# Patient Record
Sex: FEMALE | Race: WHITE | NOT HISPANIC OR LATINO | Employment: OTHER | URBAN - METROPOLITAN AREA
[De-identification: names, ages, dates, MRNs, and addresses within clinical notes are randomized per-mention and may not be internally consistent; named-entity substitution may affect disease eponyms.]

---

## 2018-01-09 ENCOUNTER — APPOINTMENT (OUTPATIENT)
Dept: WOMENS IMAGING | Facility: HOSPITAL | Age: 53
End: 2018-01-09

## 2018-01-09 PROCEDURE — 77067 SCR MAMMO BI INCL CAD: CPT | Performed by: RADIOLOGY

## 2018-01-09 PROCEDURE — 77063 BREAST TOMOSYNTHESIS BI: CPT | Performed by: RADIOLOGY

## 2018-05-29 ENCOUNTER — OFFICE VISIT (OUTPATIENT)
Dept: CARDIOLOGY | Facility: CLINIC | Age: 53
End: 2018-05-29

## 2018-05-29 VITALS
OXYGEN SATURATION: 92 % | DIASTOLIC BLOOD PRESSURE: 71 MMHG | WEIGHT: 101.2 LBS | HEART RATE: 72 BPM | HEIGHT: 59 IN | SYSTOLIC BLOOD PRESSURE: 101 MMHG | BODY MASS INDEX: 20.4 KG/M2

## 2018-05-29 DIAGNOSIS — R06.02 SHORTNESS OF BREATH: Primary | ICD-10-CM

## 2018-05-29 DIAGNOSIS — E78.2 MIXED HYPERLIPIDEMIA: ICD-10-CM

## 2018-05-29 DIAGNOSIS — I10 ESSENTIAL HYPERTENSION: ICD-10-CM

## 2018-05-29 PROCEDURE — 99204 OFFICE O/P NEW MOD 45 MIN: CPT | Performed by: PHYSICIAN ASSISTANT

## 2018-05-29 RX ORDER — PRAVASTATIN SODIUM 10 MG
10 TABLET ORAL DAILY
COMMUNITY

## 2018-05-29 RX ORDER — LOSARTAN POTASSIUM 50 MG/1
50 TABLET ORAL DAILY
Qty: 30 TABLET | Refills: 5 | Status: SHIPPED | OUTPATIENT
Start: 2018-05-29 | End: 2018-11-23 | Stop reason: SDUPTHER

## 2018-05-29 RX ORDER — LOSARTAN POTASSIUM AND HYDROCHLOROTHIAZIDE 12.5; 5 MG/1; MG/1
1 TABLET ORAL DAILY
COMMUNITY
End: 2018-05-29

## 2018-05-29 RX ORDER — ALENDRONATE SODIUM 70 MG/1
70 TABLET ORAL
COMMUNITY

## 2018-05-29 RX ORDER — LORATADINE 10 MG/1
10 CAPSULE, LIQUID FILLED ORAL DAILY
COMMUNITY

## 2018-05-29 RX ORDER — MONTELUKAST SODIUM 10 MG/1
10 TABLET ORAL NIGHTLY
COMMUNITY

## 2018-05-29 NOTE — PROGRESS NOTES
Subjective   Idania Ramesh is a 53 y.o. female     Chief Complaint   Patient presents with   • Establish Care     PATIENT APPEARS IN OFFICE TODAY TO [facility] CARDIAC CARE    • Hypertension       HPI  The patient presents today for initial evaluation.  She presents in setting of hypertension and shortness of air.  The patient denies cardiovascular history.  She reports a previous cardiac evaluation approximately 5 years ago which was very much unremarkable.  She was seen at that time because of bilateral arm discomfort.  She really has done well since with regards to symptoms.  She has chronic dyspnea which she feels is more related to her reactive airways disease.  She denies chest pain.  She has no further bilateral arm discomfort.  She denies PND orthopnea.  Hypertension has been an issue for her recently.  She tells me that she first started noticing hypertension of any significance approximately 2 years ago.  This occurred mostly with stress.  She was initially treated with diltiazem but had numerous issues with that.  Then eventually was discontinued.  She has since been treated with losartan HCTZ.  That has been a recent start.  She now has hypotension.  She brings a blood pressure log with her today were blood pressure values are 100/60 and typically lower.  Would hypotensive, she gets very weak and dizzy.  She has been referred for further evaluation and for review otherwise.  She has no further complaints.          Current Outpatient Prescriptions   Medication Sig Dispense Refill   • alendronate (FOSAMAX) 70 MG tablet Take 70 mg by mouth Every 7 (Seven) Days.     • estrogen, conjugated,-medroxyprogesterone (PREMPRO) 0.625-5 MG per tablet Take 1 tablet by mouth Daily.     • Loratadine (CLARITIN) 10 MG capsule Take 10 mg by mouth Daily.     • mometasone-formoterol (DULERA 200) 200-5 MCG/ACT inhaler Inhale 2 puffs 2 (Two) Times a Day.     • montelukast (SINGULAIR) 10 MG tablet Take 10 mg by mouth Every Night.      • pravastatin (PRAVACHOL) 10 MG tablet Take 10 mg by mouth Daily.     • losartan (COZAAR) 50 MG tablet Take 1 tablet by mouth Daily. 30 tablet 5     No current facility-administered medications for this visit.        Sulfa antibiotics    Past Medical History:   Diagnosis Date   • Asthma    • Hyperlipidemia    • Hypertension        Social History     Social History   • Marital status: Unknown     Spouse name: N/A   • Number of children: N/A   • Years of education: N/A     Occupational History   • Not on file.     Social History Main Topics   • Smoking status: Never Smoker   • Smokeless tobacco: Never Used   • Alcohol use No   • Drug use: No   • Sexual activity: Defer     Other Topics Concern   • Not on file     Social History Narrative   • No narrative on file       Family History   Problem Relation Age of Onset   • Heart disease Mother    • Hypertension Mother    • Hyperlipidemia Mother    • Heart disease Father    • Hyperlipidemia Father    • Hypertension Father    • Hypertension Brother    • Hyperlipidemia Brother        Review of Systems   Constitutional: Negative.  Negative for fatigue.   HENT: Negative.  Negative for congestion, rhinorrhea, sneezing and sore throat.    Eyes: Negative.  Negative for visual disturbance.   Respiratory: Positive for shortness of breath (SOA WITH EXERTION ONLY). Negative for apnea, cough, chest tightness and wheezing.    Cardiovascular: Negative.  Negative for chest pain (DENIES CP), palpitations (DENIES PALPITATIONS) and leg swelling.   Gastrointestinal: Negative.  Negative for abdominal distention, abdominal pain, nausea and vomiting.   Endocrine: Negative.  Negative for cold intolerance, heat intolerance, polyphagia and polyuria.   Genitourinary: Negative.  Negative for difficulty urinating, frequency and urgency.   Musculoskeletal: Positive for arthralgias (JOINTS). Negative for back pain, myalgias, neck pain and neck stiffness.   Skin: Negative.  Negative for rash and wound.  "  Allergic/Immunologic: Negative.  Negative for environmental allergies and food allergies.   Neurological: Negative.  Negative for dizziness, weakness, light-headedness and headaches.   Hematological: Negative.  Does not bruise/bleed easily.   Psychiatric/Behavioral: Positive for agitation (EASILY AGITATED). Negative for confusion and sleep disturbance (DENIES WAKING UP SMOTHERING/SOA). The patient is nervous/anxious (EASILY NERVOUS/ANXIOUS).        Objective     Vitals:    05/29/18 0929   BP: 101/71   BP Location: Left arm   Patient Position: Sitting   Pulse: 72   SpO2: 92%   Weight: 45.9 kg (101 lb 3.2 oz)   Height: 149.9 cm (59\")        /71 (BP Location: Left arm, Patient Position: Sitting)   Pulse 72   Ht 149.9 cm (59\")   Wt 45.9 kg (101 lb 3.2 oz)   SpO2 92%   BMI 20.44 kg/m²      Lab Results (most recent)     None          Physical Exam   Constitutional: She is oriented to person, place, and time. She appears well-developed and well-nourished. No distress.   HENT:   Head: Normocephalic and atraumatic.   Eyes: Conjunctivae and EOM are normal. Pupils are equal, round, and reactive to light.   Neck: Normal range of motion. Neck supple. No JVD present. No tracheal deviation present.   Cardiovascular: Normal rate, regular rhythm, normal heart sounds and intact distal pulses.    Pulmonary/Chest: Effort normal and breath sounds normal.   Abdominal: Soft. Bowel sounds are normal. She exhibits no distension and no mass. There is no tenderness. There is no rebound and no guarding.   Musculoskeletal: Normal range of motion. She exhibits no edema, tenderness or deformity.   Neurological: She is alert and oriented to person, place, and time.   Skin: Skin is warm and dry. No rash noted. No erythema. No pallor.   Psychiatric: She has a normal mood and affect. Her behavior is normal. Judgment and thought content normal.   Nursing note and vitals reviewed.      Procedure   Procedures         Assessment/Plan      " Diagnosis Plan   1. Shortness of breath  Adult Transthoracic Echo Complete W/ Cont if Necessary Per Protocol    Adult Transthoracic Echo Complete W/ Cont if Necessary Per Protocol   2. Essential hypertension  Adult Transthoracic Echo Complete W/ Cont if Necessary Per Protocol    Adult Transthoracic Echo Complete W/ Cont if Necessary Per Protocol   3. Mixed hyperlipidemia  Adult Transthoracic Echo Complete W/ Cont if Necessary Per Protocol    Adult Transthoracic Echo Complete W/ Cont if Necessary Per Protocol        The patient is now hypotensive on current losartan HCTZ therapy.  This is very symptomatic, all as above.  I will discontinue that formulation and restart losartan 50 mg as a single agent.  I would like to schedule for an echo just to evaluate systolic diastolic parameters, LV size, and I feel morphologies.  We discussed dietary modification, lifestyle modification, etc.  She will continue to follow blood pressures closely and call us for any issues.  I would like to see her back after echo and we can see how she is done with medication change and review echo findings at that time.  She does tell me that she had recent labs.  She tells me that lipid parameters at that time were very well treated.  We have requested a copy of those laboratories.  Further pending response to medication and echo findings.  She will call for any issues otherwise.           Patient's Body mass index is 20.44 kg/m². BMI is within normal parameters. No follow-up required.           Electronically signed by:

## 2018-06-04 ENCOUNTER — HOSPITAL ENCOUNTER (OUTPATIENT)
Dept: CARDIOLOGY | Facility: HOSPITAL | Age: 53
Discharge: HOME | End: 2018-06-04
Admitting: PHYSICIAN ASSISTANT

## 2018-06-04 ENCOUNTER — OUTSIDE FACILITY SERVICE (OUTPATIENT)
Dept: CARDIOLOGY | Facility: CLINIC | Age: 53
End: 2018-06-04

## 2018-06-04 LAB
MAXIMAL PREDICTED HEART RATE: 167 BPM
STRESS TARGET HR: 142 BPM

## 2018-06-04 PROCEDURE — 93306 TTE W/DOPPLER COMPLETE: CPT

## 2018-06-04 PROCEDURE — 93306 TTE W/DOPPLER COMPLETE: CPT | Performed by: INTERNAL MEDICINE

## 2018-06-11 ENCOUNTER — DOCUMENTATION (OUTPATIENT)
Dept: CARDIOLOGY | Facility: CLINIC | Age: 53
End: 2018-06-11

## 2018-06-12 ENCOUNTER — TELEPHONE (OUTPATIENT)
Dept: CARDIOLOGY | Facility: CLINIC | Age: 53
End: 2018-06-12

## 2018-06-12 NOTE — TELEPHONE ENCOUNTER
----- Message from Stacie Garcia MA sent at 6/12/2018  9:21 AM EDT -----      ----- Message -----  From: Sofi Mejia  Sent: 6/12/2018   9:07 AM  To: Sher Hartley [place]     Idania is returning a missed call for her echo results. [phone]

## 2018-06-27 ENCOUNTER — TELEPHONE (OUTPATIENT)
Dept: CARDIOLOGY | Facility: CLINIC | Age: 53
End: 2018-06-27

## 2018-07-30 ENCOUNTER — OFFICE VISIT (OUTPATIENT)
Dept: CARDIOLOGY | Facility: CLINIC | Age: 53
End: 2018-07-30

## 2018-07-30 VITALS
SYSTOLIC BLOOD PRESSURE: 173 MMHG | BODY MASS INDEX: 20.76 KG/M2 | DIASTOLIC BLOOD PRESSURE: 93 MMHG | HEIGHT: 59 IN | OXYGEN SATURATION: 97 % | HEART RATE: 77 BPM | WEIGHT: 103 LBS

## 2018-07-30 DIAGNOSIS — E78.5 HYPERLIPIDEMIA, UNSPECIFIED HYPERLIPIDEMIA TYPE: ICD-10-CM

## 2018-07-30 DIAGNOSIS — R06.02 SHORTNESS OF BREATH: ICD-10-CM

## 2018-07-30 DIAGNOSIS — I10 ESSENTIAL HYPERTENSION: Primary | ICD-10-CM

## 2018-07-30 PROCEDURE — 99214 OFFICE O/P EST MOD 30 MIN: CPT | Performed by: NURSE PRACTITIONER

## 2018-07-30 RX ORDER — AMLODIPINE BESYLATE 2.5 MG/1
2.5 TABLET ORAL DAILY
Qty: 30 TABLET | Refills: 11 | Status: SHIPPED | OUTPATIENT
Start: 2018-07-30 | End: 2019-05-21 | Stop reason: SDUPTHER

## 2018-07-30 NOTE — PROGRESS NOTES
Subjective   Idania Ramesh is a 53 y.o. female     Chief Complaint   Patient presents with   • Follow-up     presents for echo f/u   • Hypertension       HPI    Problem List:    1. Hypertension  1.1 Echo 6/4/18 - mild LVH; EF > 65%; DD II; mild AR; mild MR: mild TR; mild SD  2. Hyperlipidemia   3. Asthma     Patient is a 53-year-old female who presents today for follow-up on testing with her  at her side.  She denies any chest pain, pressure, palpitations, fluttering, dizziness, presyncope, syncope, orthopnea, PND or edema.  She says she gets short of breath when she walks more due to her asthma.  Patient says her blood pressure at home has still been running elevated.    We went over echo.    Current Outpatient Prescriptions   Medication Sig Dispense Refill   • alendronate (FOSAMAX) 70 MG tablet Take 70 mg by mouth Every 7 (Seven) Days.     • EPINEPHrine (EPIPEN 2-SIMON IJ) Inject  as directed As Needed.     • estrogen, conjugated,-medroxyprogesterone (PREMPRO) 0.625-5 MG per tablet Take 1 tablet by mouth Daily.     • Loratadine (CLARITIN) 10 MG capsule Take 10 mg by mouth Daily.     • losartan (COZAAR) 50 MG tablet Take 1 tablet by mouth Daily. 30 tablet 5   • mometasone-formoterol (DULERA 200) 200-5 MCG/ACT inhaler Inhale 2 puffs 2 (Two) Times a Day.     • montelukast (SINGULAIR) 10 MG tablet Take 10 mg by mouth Every Night.     • pravastatin (PRAVACHOL) 10 MG tablet Take 10 mg by mouth Daily.     • amLODIPine (NORVASC) 2.5 MG tablet Take 1 tablet by mouth Daily. 30 tablet 11     No current facility-administered medications for this visit.        ALLERGIES    Sulfa antibiotics    Past Medical History:   Diagnosis Date   • Asthma    • Hyperlipidemia    • Hypertension        Social History     Social History   • Marital status: Unknown     Spouse name: N/A   • Number of children: N/A   • Years of education: N/A     Occupational History   • Not on file.     Social History Main Topics   • Smoking status:  "Never Smoker   • Smokeless tobacco: Never Used   • Alcohol use No   • Drug use: No   • Sexual activity: Defer     Other Topics Concern   • Not on file     Social History Narrative   • No narrative on file       Family History   Problem Relation Age of Onset   • Heart disease Mother    • Hypertension Mother    • Hyperlipidemia Mother    • Heart disease Father    • Hyperlipidemia Father    • Hypertension Father    • Hypertension Brother    • Hyperlipidemia Brother        Review of Systems   Constitutional: Negative for chills, diaphoresis, fatigue and fever.   HENT: Positive for rhinorrhea. Negative for nosebleeds, sinus pain, sinus pressure and tinnitus.    Eyes: Negative for visual disturbance.   Respiratory: Positive for shortness of breath (on exertion - more walking due to asthma). Negative for apnea, chest tightness and wheezing.    Cardiovascular: Negative for chest pain (denies CP), palpitations (denies palps) and leg swelling.   Gastrointestinal: Negative for abdominal pain, blood in stool, constipation, diarrhea, nausea and vomiting.   Endocrine: Negative.    Genitourinary: Negative for hematuria.   Musculoskeletal: Positive for arthralgias. Negative for back pain, myalgias and neck pain.   Skin: Negative for rash and wound.   Allergic/Immunologic: Positive for environmental allergies. Negative for food allergies.   Neurological: Negative for dizziness, syncope, weakness, light-headedness, numbness and headaches.   Hematological: Does not bruise/bleed easily.   Psychiatric/Behavioral: Negative for agitation, confusion and sleep disturbance. The patient is nervous/anxious (on occasion).        Objective   /93 (BP Location: Left arm, Patient Position: Sitting)   Pulse 77   Ht 149.9 cm (59\")   Wt 46.7 kg (103 lb)   SpO2 97%   BMI 20.80 kg/m²   Vitals:    07/30/18 0943   BP: 173/93   BP Location: Left arm   Patient Position: Sitting   Pulse: 77   SpO2: 97%   Weight: 46.7 kg (103 lb)   Height: 149.9 cm " "(59\")      Lab Results (most recent)     None        Physical Exam   Constitutional: She is oriented to person, place, and time. Vital signs are normal. She appears well-developed and well-nourished. She is active and cooperative.   HENT:   Head: Normocephalic.   Eyes: Lids are normal.   Neck: Normal carotid pulses, no hepatojugular reflux and no JVD present. Carotid bruit is not present.   Cardiovascular: Normal rate, regular rhythm and normal heart sounds.    Pulses:       Radial pulses are 2+ on the right side, and 2+ on the left side.        Dorsalis pedis pulses are 2+ on the right side, and 2+ on the left side.        Posterior tibial pulses are 2+ on the right side, and 2+ on the left side.   No edema BLE.    Pulmonary/Chest: Effort normal and breath sounds normal.   Abdominal: Normal appearance and bowel sounds are normal.   Neurological: She is alert and oriented to person, place, and time.   Skin: Skin is warm, dry and intact.   Psychiatric: She has a normal mood and affect. Her speech is normal and behavior is normal. Judgment and thought content normal. Cognition and memory are normal.       Procedure   Procedures         Assessment/Plan      Diagnosis Plan   1. Essential hypertension  amLODIPine (NORVASC) 2.5 MG tablet   2. Hyperlipidemia, unspecified hyperlipidemia type     3. Shortness of breath         Return in about 8 weeks (around 9/24/2018).    Hypertension-patient will start amlodipine 2.5 mg.  She will take it opposite times a day as her losartan.  Hyperlipidemia-patient is on pravastatin and monitor by PCP.  Shortness of breath-stable.  Patient will monitor blood pressure and heart rate and bring to follow-up appointment.  She'll follow-up in 8 weeks or sooner if she has any changes.         Patient's Body mass index is 20.8 kg/m². BMI is within normal parameters. No follow-up required.      Electronically signed by:        "

## 2018-07-30 NOTE — PATIENT INSTRUCTIONS

## 2018-07-31 PROBLEM — R06.02 SHORTNESS OF BREATH: Status: ACTIVE | Noted: 2018-07-31

## 2018-11-23 RX ORDER — LOSARTAN POTASSIUM 50 MG/1
TABLET ORAL
Qty: 30 TABLET | Refills: 5 | Status: SHIPPED | OUTPATIENT
Start: 2018-11-23 | End: 2019-05-21 | Stop reason: SDUPTHER

## 2019-01-10 ENCOUNTER — APPOINTMENT (OUTPATIENT)
Dept: WOMENS IMAGING | Facility: HOSPITAL | Age: 54
End: 2019-01-10

## 2019-01-10 PROCEDURE — 77063 BREAST TOMOSYNTHESIS BI: CPT | Performed by: RADIOLOGY

## 2019-01-10 PROCEDURE — 77067 SCR MAMMO BI INCL CAD: CPT | Performed by: RADIOLOGY

## 2019-05-21 ENCOUNTER — OFFICE VISIT (OUTPATIENT)
Dept: CARDIOLOGY | Facility: CLINIC | Age: 54
End: 2019-05-21

## 2019-05-21 VITALS
HEART RATE: 89 BPM | WEIGHT: 111.6 LBS | OXYGEN SATURATION: 96 % | SYSTOLIC BLOOD PRESSURE: 150 MMHG | HEIGHT: 59 IN | DIASTOLIC BLOOD PRESSURE: 93 MMHG | BODY MASS INDEX: 22.5 KG/M2

## 2019-05-21 DIAGNOSIS — E78.5 HYPERLIPIDEMIA, UNSPECIFIED HYPERLIPIDEMIA TYPE: ICD-10-CM

## 2019-05-21 DIAGNOSIS — I10 ESSENTIAL HYPERTENSION: Primary | ICD-10-CM

## 2019-05-21 DIAGNOSIS — R06.02 SHORTNESS OF BREATH: ICD-10-CM

## 2019-05-21 PROCEDURE — 93000 ELECTROCARDIOGRAM COMPLETE: CPT | Performed by: NURSE PRACTITIONER

## 2019-05-21 PROCEDURE — 99214 OFFICE O/P EST MOD 30 MIN: CPT | Performed by: NURSE PRACTITIONER

## 2019-05-21 RX ORDER — LOSARTAN POTASSIUM 50 MG/1
TABLET ORAL
Qty: 30 TABLET | Refills: 5 | Status: SHIPPED | OUTPATIENT
Start: 2019-05-21 | End: 2019-11-05 | Stop reason: SDUPTHER

## 2019-05-21 RX ORDER — AMLODIPINE BESYLATE 5 MG/1
5 TABLET ORAL DAILY
Qty: 30 TABLET | Refills: 11 | Status: SHIPPED | OUTPATIENT
Start: 2019-05-21 | End: 2019-11-05 | Stop reason: SDUPTHER

## 2019-05-21 NOTE — PROGRESS NOTES
Subjective   Idania Ramesh is a 54 y.o. female     Chief Complaint   Patient presents with   • Follow-up   • Hypertension       HPI    Problem List:    1. Hypertension  1.1 Echo 6/4/18 - mild LVH; EF > 65%; DD II; mild AR; mild MR: mild TR; mild MA  2. Hyperlipidemia   3. Asthma     Patient is a 54-year-old female who presents today for follow-up on medication change.  She denies any chest pain, pressure, palpitations, fluttering, dizziness, presyncope, syncope, orthopnea, PND or edema.  She denies any shortness of breath with activity.  She is very close to her 's family and this is been helping her a lot.  Her blood pressure however is still elevated so we are going to increase her amlodipine.    Current Outpatient Medications   Medication Sig Dispense Refill   • alendronate (FOSAMAX) 70 MG tablet Take 70 mg by mouth Every 7 (Seven) Days.     • amLODIPine (NORVASC) 2.5 MG tablet Take 1 tablet by mouth Daily. 30 tablet 11   • EPINEPHrine (EPIPEN 2-SIMON IJ) Inject  as directed As Needed.     • Loratadine (CLARITIN) 10 MG capsule Take 10 mg by mouth Daily.     • losartan (COZAAR) 50 MG tablet TAKE 1 TABLET DAILY 30 tablet 5   • mometasone-formoterol (DULERA 200) 200-5 MCG/ACT inhaler Inhale 2 puffs 2 (Two) Times a Day.     • montelukast (SINGULAIR) 10 MG tablet Take 10 mg by mouth Every Night.     • pravastatin (PRAVACHOL) 10 MG tablet Take 10 mg by mouth Daily.     • estrogen, conjugated,-medroxyprogesterone (PREMPRO) 0.625-5 MG per tablet Take 1 tablet by mouth Daily.       No current facility-administered medications for this visit.        ALLERGIES    Sulfa antibiotics    Past Medical History:   Diagnosis Date   • Asthma    • Hyperlipidemia    • Hypertension        Social History     Socioeconomic History   • Marital status:      Spouse name: Not on file   • Number of children: Not on file   • Years of education: Not on file   • Highest education level: Not on file   Tobacco Use   • Smoking  "status: Never Smoker   • Smokeless tobacco: Never Used   Substance and Sexual Activity   • Alcohol use: No   • Drug use: No   • Sexual activity: Defer       Family History   Problem Relation Age of Onset   • Heart disease Mother    • Hypertension Mother    • Hyperlipidemia Mother    • Heart disease Father    • Hyperlipidemia Father    • Hypertension Father    • Hypertension Brother    • Hyperlipidemia Brother        Review of Systems   Constitutional: Negative for diaphoresis and fatigue.   HENT: Negative for congestion, rhinorrhea and sore throat.    Eyes: Negative for visual disturbance.   Respiratory: Negative for chest tightness and shortness of breath.    Cardiovascular: Negative for chest pain (Denies CP ), palpitations and leg swelling.   Gastrointestinal: Negative for abdominal pain, blood in stool, constipation, diarrhea, nausea and vomiting.   Endocrine: Negative for cold intolerance and heat intolerance.   Genitourinary: Negative for difficulty urinating, dysuria, frequency, hematuria and urgency.   Musculoskeletal: Positive for arthralgias. Negative for back pain and neck pain.   Skin: Negative for rash and wound.   Allergic/Immunologic: Positive for environmental allergies (seasonal ). Negative for food allergies.   Neurological: Negative for dizziness, syncope, weakness, light-headedness, numbness and headaches.   Hematological: Does not bruise/bleed easily.   Psychiatric/Behavioral: Negative for sleep disturbance. The patient is not nervous/anxious.         Pt states she currently sees grief counselor due to the loss of her         Objective   /93   Pulse 89   Ht 149.9 cm (59\")   Wt 50.6 kg (111 lb 9.6 oz)   SpO2 96%   BMI 22.54 kg/m²   Vitals:    05/21/19 1419   BP: 150/93   Pulse: 89   SpO2: 96%   Weight: 50.6 kg (111 lb 9.6 oz)   Height: 149.9 cm (59\")      Lab Results (most recent)     None        Physical Exam   Constitutional: She is oriented to person, place, and time. Vital " signs are normal. She appears well-developed and well-nourished. She is active and cooperative.   HENT:   Head: Normocephalic.   Eyes: Lids are normal.   Neck: Normal carotid pulses, no hepatojugular reflux and no JVD present. Carotid bruit is not present.   Cardiovascular: Normal rate, regular rhythm and normal heart sounds.   Pulses:       Radial pulses are 2+ on the right side, and 2+ on the left side.        Dorsalis pedis pulses are 2+ on the right side, and 2+ on the left side.        Posterior tibial pulses are 2+ on the right side, and 2+ on the left side.   No edema BLE.    Pulmonary/Chest: Effort normal and breath sounds normal.   Abdominal: Normal appearance and bowel sounds are normal.   Neurological: She is alert and oriented to person, place, and time.   Skin: Skin is warm, dry and intact.   Psychiatric: She has a normal mood and affect. Her speech is normal and behavior is normal. Judgment and thought content normal. Cognition and memory are normal.       Procedure     ECG 12 Lead  Date/Time: 5/21/2019 2:48 PM  Performed by: Dianelys Cagle APRN  Authorized by: Dianelys Cagle APRN   Comparison: compared with previous ECG from 5/16/2018  Similar to previous ECG  Rhythm: sinus rhythm  Rate: normal  BPM: 91  QRS axis: normal    Clinical impression: normal ECG                 Assessment/Plan      Diagnosis Plan   1. Essential hypertension     2. Hyperlipidemia, unspecified hyperlipidemia type     3. Shortness of breath         Return in about 3 months (around 8/21/2019).       Hypertension-increase amlodipine to 5 mg and she will continue her losartan.  Hyperlipidemia-patient is on pravastatin monitor by PCP.  Shortness of breath-resolved.  She will continue her medication regimen.  She will follow-up in 3 months or sooner if any changes.  If her blood pressure is not staying controlled she will contact our office and we will make further changes.      Patient's Body mass index is 22.54 kg/m². BMI is  within normal parameters. No follow-up required..      Electronically signed by:

## 2019-05-21 NOTE — PATIENT INSTRUCTIONS

## 2019-08-30 ENCOUNTER — OFFICE VISIT (OUTPATIENT)
Dept: CARDIOLOGY | Facility: CLINIC | Age: 54
End: 2019-08-30

## 2019-08-30 VITALS
BODY MASS INDEX: 23.06 KG/M2 | HEART RATE: 99 BPM | OXYGEN SATURATION: 90 % | HEIGHT: 59 IN | WEIGHT: 114.4 LBS | DIASTOLIC BLOOD PRESSURE: 76 MMHG | SYSTOLIC BLOOD PRESSURE: 98 MMHG

## 2019-08-30 DIAGNOSIS — R06.02 SHORTNESS OF BREATH: ICD-10-CM

## 2019-08-30 DIAGNOSIS — E78.5 HYPERLIPIDEMIA, UNSPECIFIED HYPERLIPIDEMIA TYPE: ICD-10-CM

## 2019-08-30 DIAGNOSIS — I10 ESSENTIAL HYPERTENSION: Primary | ICD-10-CM

## 2019-08-30 PROCEDURE — 99213 OFFICE O/P EST LOW 20 MIN: CPT | Performed by: NURSE PRACTITIONER

## 2019-08-30 RX ORDER — ALBUTEROL SULFATE 90 UG/1
1 AEROSOL, METERED RESPIRATORY (INHALATION)
COMMUNITY
End: 2022-09-01

## 2019-08-30 NOTE — PROGRESS NOTES
Subjective   Idania Ramesh is a 54 y.o. female         HPI    Problem List:    1. Hypertension  1.1 Echo 6/4/18 - mild LVH; EF > 65%; DD II; mild AR; mild MR: mild TR; mild RI  2. Hyperlipidemia   3. Asthma     Patient is a 54-year-old female who presents today for a follow-up.  She denies any chest pain, pressure, palpitations, fluttering, dizziness, presyncope, syncope, orthopnea, PND or edema.  She says she does get short orf breath with increased activity only.  She has been having some wheezing and that she uses inhalers.  She has overall done well.  She says that it is coming up to 1 yr that her  passed away.  She walks 2 miles every day with her mother-in-law.  She is going to release 365 balloons for his memory.     Current Outpatient Medications   Medication Sig Dispense Refill   • albuterol sulfate HFA (VENTOLIN HFA) 108 (90 Base) MCG/ACT inhaler      • alendronate (FOSAMAX) 70 MG tablet Take 70 mg by mouth Every 7 (Seven) Days.     • amLODIPine (NORVASC) 5 MG tablet Take 1 tablet by mouth Daily. 30 tablet 11   • Beclomethasone Diprop HFA (QVAR REDIHALER) 80 MCG/ACT inhaler      • EPINEPHrine (EPIPEN 2-SIMON IJ) Inject  as directed As Needed.     • estrogen, conjugated,-medroxyprogesterone (PREMPRO) 0.625-5 MG per tablet Take 1 tablet by mouth Daily.     • Loratadine (CLARITIN) 10 MG capsule Take 10 mg by mouth Daily.     • losartan (COZAAR) 50 MG tablet TAKE 1 TABLET DAILY 30 tablet 5   • mometasone-formoterol (DULERA 200) 200-5 MCG/ACT inhaler Inhale 2 puffs 2 (Two) Times a Day.     • montelukast (SINGULAIR) 10 MG tablet Take 10 mg by mouth Every Night.     • pravastatin (PRAVACHOL) 10 MG tablet Take 10 mg by mouth Daily.       No current facility-administered medications for this visit.        ALLERGIES    Sulfa antibiotics    Past Medical History:   Diagnosis Date   • Asthma    • Hyperlipidemia    • Hypertension        Social History     Socioeconomic History   • Marital status:       "Spouse name: Not on file   • Number of children: Not on file   • Years of education: Not on file   • Highest education level: Not on file   Tobacco Use   • Smoking status: Never Smoker   • Smokeless tobacco: Never Used   Substance and Sexual Activity   • Alcohol use: No   • Drug use: No   • Sexual activity: Defer       Family History   Problem Relation Age of Onset   • Heart disease Mother    • Hypertension Mother    • Hyperlipidemia Mother    • Heart disease Father    • Hyperlipidemia Father    • Hypertension Father    • Hypertension Brother    • Hyperlipidemia Brother        Review of Systems   Constitutional: Negative for diaphoresis and fatigue.   HENT: Negative for congestion, rhinorrhea and sore throat.    Eyes: Negative for visual disturbance.   Respiratory: Positive for shortness of breath (w/ increased activity ) and wheezing (just started some new inhalers ). Negative for chest tightness.    Cardiovascular: Negative for chest pain (Denies CP ), palpitations and leg swelling.   Gastrointestinal: Negative for abdominal pain, blood in stool, constipation, diarrhea, nausea and vomiting.   Endocrine: Negative for cold intolerance and heat intolerance.   Genitourinary: Negative for difficulty urinating, dysuria, frequency, hematuria and urgency.   Musculoskeletal: Negative for arthralgias, back pain and neck pain.   Skin: Negative for rash and wound.   Allergic/Immunologic: Positive for environmental allergies (seasonal ). Negative for food allergies.   Neurological: Negative for dizziness, syncope, weakness, light-headedness, numbness and headaches.   Hematological: Does not bruise/bleed easily.   Psychiatric/Behavioral: Negative for sleep disturbance.       Objective   BP 98/76   Pulse 99   Ht 149.9 cm (59\")   Wt 51.9 kg (114 lb 6.4 oz)   SpO2 90%   BMI 23.11 kg/m²   Vitals:    08/30/19 0853   BP: 98/76   Pulse: 99   SpO2: 90%   Weight: 51.9 kg (114 lb 6.4 oz)   Height: 149.9 cm (59\")      Lab Results " (most recent)     None        Physical Exam   Constitutional: She is oriented to person, place, and time. Vital signs are normal. She appears well-developed and well-nourished. She is active and cooperative.   HENT:   Head: Normocephalic.   Eyes: Lids are normal.   Neck: Normal carotid pulses, no hepatojugular reflux and no JVD present. Carotid bruit is not present.   Cardiovascular: Normal rate, regular rhythm and normal heart sounds.   Pulses:       Radial pulses are 2+ on the right side, and 2+ on the left side.        Dorsalis pedis pulses are 2+ on the right side, and 2+ on the left side.        Posterior tibial pulses are 2+ on the right side, and 2+ on the left side.   No edema BLE.   Pulmonary/Chest: Effort normal and breath sounds normal.   Abdominal: Normal appearance and bowel sounds are normal.   Neurological: She is alert and oriented to person, place, and time.   Skin: Skin is warm, dry and intact.   Psychiatric: She has a normal mood and affect. Her speech is normal and behavior is normal. Judgment and thought content normal. Cognition and memory are normal.       Procedure   Procedures         Assessment/Plan      Diagnosis Plan   1. Essential hypertension     2. Hyperlipidemia, unspecified hyperlipidemia type     3. Shortness of breath         Return in about 6 months (around 2/29/2020).    Hypertension - patient is doing well on norvasc and losartan.  Her BP is actually low so she will take both if systolic is over 130 otherwise she will only take one.  Shortness of breath - stable.  She will continue her medication regimen.  She will follow-up in 6 mos or sooner if any changes.            Patient's Body mass index is 23.11 kg/m². BMI is within normal parameters. No follow-up required..      Electronically signed by:

## 2019-08-30 NOTE — PATIENT INSTRUCTIONS
Bronchospasm, Adult    Bronchospasm is a tightening of the airways going into the lungs. During an episode, it may be harder to breathe. You may cough, and you may make a whistling sound when you breathe (wheeze).  This condition often affects people with asthma.  What are the causes?  This condition is caused by swelling and irritation in the airways. It can be triggered by:  · An infection (common).  · Seasonal allergies.  · An allergic reaction.  · Exercise.  · Irritants. These include pollution, cigarette smoke, strong odors, aerosol sprays, and paint fumes.  · Weather changes. Winds increase molds and pollens in the air. Cold air may cause swelling.  · Stress and emotional upset.  What are the signs or symptoms?  Symptoms of this condition include:  · Wheezing. If the episode was triggered by an allergy, wheezing may start right away or hours later.  · Nighttime coughing.  · Frequent or severe coughing with a simple cold.  · Chest tightness.  · Shortness of breath.  · Decreased ability to exercise.  How is this diagnosed?  This condition is usually diagnosed with a review of your medical history and a physical exam. Tests, such as lung function tests, are sometimes done to look for other conditions. The need for a chest X-ray depends on where the wheezing occurs and whether it is the first time you have wheezed.  How is this treated?  This condition may be treated with:  · Inhaled medicines. These open up the airways and help you breathe. They can be taken with an inhaler or a nebulizer device.  · Corticosteroid medicines. These may be given for severe bronchospasm, usually when it is associated with asthma.  · Avoiding triggers, such as irritants, infection, or allergies.  Follow these instructions at home:  Medicines  · Take over-the-counter and prescription medicines only as told by your health care provider.  · If you need to use an inhaler or nebulizer to take your medicine, ask your health care provider  to explain how to use it correctly. If you were given a spacer, always use it with your inhaler.  Lifestyle  · Reduce the number of triggers in your home. To do this:  ? Change your heating and air conditioning filter at least once a month.  ? Limit your use of fireplaces and wood stoves.  ? Do not smoke. Do not allow smoking in your home.  ? Avoid using perfumes and fragrances.  ? Get rid of pests, such as roaches and mice, and their droppings.  ? Remove any mold from your home.  ? Keep your house clean and dust free. Use unscented cleaning products.  ? Replace carpet with wood, tile, or vinyl sarah. Carpet can trap dander and dust.  ? Use allergy-proof pillows, mattress covers, and box spring covers.  ? Wash bed sheets and blankets every week in hot water. Dry them in a dryer.  ? Use blankets that are made of polyester or cotton.  ? Wash your hands often.  ? Do not allow pets in your bedroom.  · Avoid breathing in cold air when you exercise.  General instructions  · Have a plan for seeking medical care. Know when to call your health care provider and local emergency services, and where to get emergency care.  · Stay up to date on your immunizations.  · When you have an episode of bronchospasm, stay calm. Try to relax and breathe more slowly.  · If you have asthma, make sure you have an asthma action plan.  · Keep all follow-up visits as told by your health care provider. This is important.  Contact a health care provider if:  · You have muscle aches.  · You have chest pain.  · The mucus that you cough up (sputum) changes from clear or white to yellow, green, gray, or bloody.  · You have a fever.  · Your sputum gets thicker.  Get help right away if:  · Your wheezing and coughing get worse, even after you take your prescribed medicines.  · It gets even harder to breathe.  · You develop severe chest pain.  Summary  · Bronchospasm is a tightening of the airways going into the lungs.  · During an episode of  bronchospasm, you may have a harder time breathing. You may cough and make a whistling sound when you breathe (wheeze).  · Avoid exposure to triggers such as smoke, dust, mold, animal dander, and fragrances.  · When you have an episode of bronchospasm, stay calm. Try to relax and breathe more slowly.  This information is not intended to replace advice given to you by your health care provider. Make sure you discuss any questions you have with your health care provider.  Document Released: 12/20/2004 Document Revised: 12/14/2017 Document Reviewed: 12/14/2017  [employer] Patient Education © 2019 [employer]

## 2019-11-05 DIAGNOSIS — I10 ESSENTIAL HYPERTENSION: ICD-10-CM

## 2019-11-05 RX ORDER — AMLODIPINE BESYLATE 5 MG/1
5 TABLET ORAL DAILY
Qty: 30 TABLET | Refills: 11 | Status: SHIPPED | OUTPATIENT
Start: 2019-11-05 | End: 2020-03-02

## 2019-11-05 RX ORDER — LOSARTAN POTASSIUM 50 MG/1
50 TABLET ORAL DAILY
Qty: 30 TABLET | Refills: 5 | Status: SHIPPED | OUTPATIENT
Start: 2019-11-05 | End: 2020-01-21

## 2020-01-13 ENCOUNTER — APPOINTMENT (OUTPATIENT)
Dept: WOMENS IMAGING | Facility: HOSPITAL | Age: 55
End: 2020-01-13

## 2020-01-13 PROCEDURE — 77067 SCR MAMMO BI INCL CAD: CPT | Performed by: RADIOLOGY

## 2020-01-13 PROCEDURE — 77063 BREAST TOMOSYNTHESIS BI: CPT | Performed by: RADIOLOGY

## 2020-01-21 ENCOUNTER — TELEPHONE (OUTPATIENT)
Dept: CARDIOLOGY | Facility: CLINIC | Age: 55
End: 2020-01-21

## 2020-01-21 RX ORDER — VALSARTAN 80 MG/1
80 TABLET ORAL DAILY
Qty: 90 TABLET | Refills: 3 | Status: SHIPPED | OUTPATIENT
Start: 2020-01-21 | End: 2020-03-02

## 2020-01-21 NOTE — TELEPHONE ENCOUNTER
Called patient and notified her of verbal orders from provider to D/C Losartan and new rx was sent in for Valsartan 80 mg daily. Patient was notified to continue and monitor heart rate and blood pressure, call office if any issues prior to follow up with provider. -BH;SWETA

## 2020-01-21 NOTE — TELEPHONE ENCOUNTER
----- Message from Dianelys CARDENAS GARDENIA Cagle sent at 1/21/2020  3:54 PM EST -----  Contact: [phone]  Have her try valsartan 80 mg PO daily and watch BP.  Any problems call back and let us know.   ----- Message -----  From: Rayna Saha MA  Sent: 1/21/2020   3:37 PM EST  To: GARDENIA Bryant    Patient on Losartan 50 mg daily. Last time when picking up from pharmacy they notified her Losartan 50 mg has been D/C and they only can give her Losartan 100 mg. She has been taking 1/2 tablet daily (very hard to get it exactly at half). Her blood pressure has been fluctuating. She takes this of AM, last PM blood pressure 93/67, yesterday /66, Sunday /76, Sunday /90. HR has been stable in the 70-80 range. She wants to know if there is something else she can try in replacement of this?  ----- Message -----  From: Irina Camp  Sent: 1/21/2020  10:37 AM EST  To: Rayna Saha MA    PT HAS CONCERNS ABOUT HER BP MEDS, THEY HAVE BEEN KEEPING A LOG AT HOME AND IT HAS BEEN GOING UP AND DOWN.

## 2020-03-02 ENCOUNTER — OFFICE VISIT (OUTPATIENT)
Dept: CARDIOLOGY | Facility: CLINIC | Age: 55
End: 2020-03-02

## 2020-03-02 VITALS
SYSTOLIC BLOOD PRESSURE: 135 MMHG | HEART RATE: 85 BPM | OXYGEN SATURATION: 97 % | HEIGHT: 59 IN | BODY MASS INDEX: 23.14 KG/M2 | DIASTOLIC BLOOD PRESSURE: 82 MMHG | WEIGHT: 114.8 LBS

## 2020-03-02 DIAGNOSIS — I51.89 GRADE II DIASTOLIC DYSFUNCTION: ICD-10-CM

## 2020-03-02 DIAGNOSIS — R06.02 SHORTNESS OF BREATH: ICD-10-CM

## 2020-03-02 DIAGNOSIS — E78.5 HYPERLIPIDEMIA, UNSPECIFIED HYPERLIPIDEMIA TYPE: ICD-10-CM

## 2020-03-02 DIAGNOSIS — I10 ESSENTIAL HYPERTENSION: Primary | ICD-10-CM

## 2020-03-02 PROCEDURE — 99213 OFFICE O/P EST LOW 20 MIN: CPT | Performed by: NURSE PRACTITIONER

## 2020-03-02 RX ORDER — LOSARTAN POTASSIUM 50 MG/1
50 TABLET ORAL DAILY
COMMUNITY
Start: 2020-02-21 | End: 2020-07-14

## 2020-03-02 NOTE — PROGRESS NOTES
Subjective   Idania Ramesh is a 54 y.o. female     Chief Complaint   Patient presents with   • Hypertension       HPI    Problem List:    1. Hypertension  1.1 Echo 6/4/18 - mild LVH; EF > 65%; DD II; mild AR; mild MR: mild TR; mild WI  2. Hyperlipidemia   3. Asthma     Patient is a 54-year-old female who presents today for a follow-up.  She denies any chest pain, pressure, palpitations, fluttering, dizziness, presyncope, syncope, orthopnea, PND or edema.  She says the only time she notices shortness of breath is when she first starts walking but by the second round she is absolutely fine.  She says she walks 2 miles with no problems.  This is the only time this ever occurs.  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  She hopes to graduate by the end of the year.    Current Outpatient Medications on File Prior to Visit   Medication Sig Dispense Refill   • albuterol sulfate HFA (VENTOLIN HFA) 108 (90 Base) MCG/ACT inhaler Inhale 1 puff.     • alendronate (FOSAMAX) 70 MG tablet Take 70 mg by mouth Every 7 (Seven) Days.     • Beclomethasone Diprop HFA (QVAR REDIHALER) 80 MCG/ACT inhaler      • EPINEPHrine (EPIPEN 2-SIMON IJ) Inject  as directed As Needed.     • Loratadine (CLARITIN) 10 MG capsule Take 10 mg by mouth Daily.     • losartan (COZAAR) 50 MG tablet Take 50 mg by mouth Daily.     • mometasone-formoterol (DULERA 200) 200-5 MCG/ACT inhaler Inhale 2 puffs 2 (Two) Times a Day.     • montelukast (SINGULAIR) 10 MG tablet Take 10 mg by mouth Every Night.     • pravastatin (PRAVACHOL) 10 MG tablet Take 10 mg by mouth Daily.     • [DISCONTINUED] amLODIPine (NORVASC) 5 MG tablet Take 1 tablet by mouth Daily. 30 tablet 11   • [DISCONTINUED] estrogen, conjugated,-medroxyprogesterone (PREMPRO) 0.625-5 MG per tablet Take 1 tablet by mouth Daily.     • [DISCONTINUED] valsartan (DIOVAN) 80 MG tablet Take 1 tablet by mouth Daily. 90 tablet 3     No current facility-administered medications on file  prior to visit.        ALLERGIES    Sulfa antibiotics    Past Medical History:   Diagnosis Date   • Asthma    • Hyperlipidemia    • Hypertension        Social History     Socioeconomic History   • Marital status:      Spouse name: Not on file   • Number of children: Not on file   • Years of education: Not on file   • Highest education level: Not on file   Tobacco Use   • Smoking status: Never Smoker   • Smokeless tobacco: Never Used   Substance and Sexual Activity   • Alcohol use: No   • Drug use: No   • Sexual activity: Never       Family History   Problem Relation Age of Onset   • Heart disease Mother    • Hypertension Mother    • Hyperlipidemia Mother    • Heart disease Father    • Hyperlipidemia Father    • Hypertension Father    • Hypertension Brother    • Hyperlipidemia Brother        Review of Systems   Constitutional: Negative for diaphoresis and fatigue.   HENT: Negative for congestion, rhinorrhea and sneezing.    Eyes: Negative for visual disturbance.   Respiratory: Positive for shortness of breath (sometimes just during the first round of walking and then 2nd one she is fine; walk 2 miles; talks when she walks, only time ). Negative for cough, chest tightness and wheezing.    Cardiovascular: Negative for chest pain, palpitations and leg swelling.   Gastrointestinal: Negative for abdominal pain, nausea and vomiting.   Endocrine: Negative for cold intolerance and heat intolerance.   Genitourinary: Negative for difficulty urinating, frequency and urgency.   Musculoskeletal: Negative for arthralgias, back pain and neck pain.   Skin: Negative for rash and wound.   Allergic/Immunologic: Negative for environmental allergies and food allergies.   Neurological: Negative for dizziness, syncope and light-headedness.   Hematological: Does not bruise/bleed easily.   Psychiatric/Behavioral: Negative for agitation, confusion and sleep disturbance (denies waking up smothering/SOA). The patient is not  "nervous/anxious.        Objective   /82 (BP Location: Left arm, Patient Position: Sitting)   Pulse 85   Ht 149.9 cm (59\")   Wt 52.1 kg (114 lb 12.8 oz)   SpO2 97%   BMI 23.19 kg/m²   Vitals:    03/02/20 0939   BP: 135/82   BP Location: Left arm   Patient Position: Sitting   Pulse: 85   SpO2: 97%   Weight: 52.1 kg (114 lb 12.8 oz)   Height: 149.9 cm (59\")      Lab Results (most recent)     None        Physical Exam   Constitutional: She is oriented to person, place, and time. Vital signs are normal. She appears well-developed and well-nourished. She is active and cooperative.   HENT:   Head: Normocephalic.   Eyes: Lids are normal.   Neck: Normal carotid pulses, no hepatojugular reflux and no JVD present. Carotid bruit is not present.   Cardiovascular: Normal rate, regular rhythm and normal heart sounds.   Pulses:       Radial pulses are 2+ on the right side, and 2+ on the left side.        Dorsalis pedis pulses are 2+ on the right side, and 2+ on the left side.        Posterior tibial pulses are 2+ on the right side, and 2+ on the left side.   No edema BLE.   Pulmonary/Chest: Effort normal and breath sounds normal.   Abdominal: Normal appearance and bowel sounds are normal.   Neurological: She is alert and oriented to person, place, and time.   Skin: Skin is warm, dry and intact.   Psychiatric: She has a normal mood and affect. Her speech is normal and behavior is normal. Judgment and thought content normal. Cognition and memory are normal.       Procedure   Procedures         Assessment/Plan      Diagnosis Plan   1. Essential hypertension     2. Hyperlipidemia, unspecified hyperlipidemia type     3. Shortness of breath     4. Grade II diastolic dysfunction         Return in about 6 months (around 9/2/2020).    Hypertension-patient's doing well with losartan.  Hyperlipidemia-patient's on pravastatin monitor by PCP.  Shortness of breath-stable.  Diastolic dysfunction-stable.  She will continue her " medication regimen.  She will follow-up in 6 months or sooner if any changes.       Patient's Body mass index is 23.19 kg/m². BMI is within normal parameters. No follow-up required..      Electronically signed by:

## 2020-07-14 RX ORDER — LOSARTAN POTASSIUM 50 MG/1
TABLET ORAL
Qty: 30 TABLET | Refills: 5 | Status: SHIPPED | OUTPATIENT
Start: 2020-07-14 | End: 2021-01-13

## 2020-09-03 ENCOUNTER — OFFICE VISIT (OUTPATIENT)
Dept: CARDIOLOGY | Facility: CLINIC | Age: 55
End: 2020-09-03

## 2020-09-03 VITALS
HEIGHT: 59 IN | SYSTOLIC BLOOD PRESSURE: 139 MMHG | HEART RATE: 79 BPM | WEIGHT: 115 LBS | DIASTOLIC BLOOD PRESSURE: 90 MMHG | OXYGEN SATURATION: 95 % | BODY MASS INDEX: 23.18 KG/M2

## 2020-09-03 DIAGNOSIS — I10 ESSENTIAL HYPERTENSION: Primary | ICD-10-CM

## 2020-09-03 DIAGNOSIS — R06.02 SHORTNESS OF BREATH: ICD-10-CM

## 2020-09-03 DIAGNOSIS — E78.5 HYPERLIPIDEMIA, UNSPECIFIED HYPERLIPIDEMIA TYPE: ICD-10-CM

## 2020-09-03 DIAGNOSIS — I51.89 GRADE II DIASTOLIC DYSFUNCTION: ICD-10-CM

## 2020-09-03 PROCEDURE — 99213 OFFICE O/P EST LOW 20 MIN: CPT | Performed by: NURSE PRACTITIONER

## 2020-09-03 PROCEDURE — 93000 ELECTROCARDIOGRAM COMPLETE: CPT | Performed by: NURSE PRACTITIONER

## 2020-09-03 RX ORDER — LEVOTHYROXINE SODIUM 0.03 MG/1
25 TABLET ORAL DAILY
COMMUNITY

## 2020-09-03 NOTE — PROGRESS NOTES
Subjective   Idania Ramesh is a 55 y.o. female     Chief Complaint   Patient presents with   • Follow-up     6 months follow up       HPI    Problem List:    1. Hypertension  1.1 Echo 6/4/18 - mild LVH; EF > 65%; DD II; mild AR; mild MR: mild TR; mild CA  2. Hyperlipidemia   3. Asthma     Patient is a 55-year-old female who presents today for follow-up.  She denies any chest pain, pressure, palpitation, fluttering, dizziness, presyncope, syncope, orthopnea, PND or edema.  She denies any shortness of breath with activity.  She says she has been doing very good.  She is working 4 times a week for 26 hours.  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  She hopes to find a job in her field.  She says her blood pressures typically doing very well.    Current Outpatient Medications on File Prior to Visit   Medication Sig Dispense Refill   • albuterol sulfate HFA (VENTOLIN HFA) 108 (90 Base) MCG/ACT inhaler Inhale 1 puff.     • alendronate (FOSAMAX) 70 MG tablet Take 70 mg by mouth Every 7 (Seven) Days.     • Beclomethasone Diprop HFA (QVAR REDIHALER) 80 MCG/ACT inhaler      • EPINEPHrine (EPIPEN 2-SIMON IJ) Inject  as directed As Needed.     • levothyroxine (SYNTHROID, LEVOTHROID) 25 MCG tablet Take 25 mcg by mouth Daily.     • Loratadine (CLARITIN) 10 MG capsule Take 10 mg by mouth Daily.     • losartan (COZAAR) 50 MG tablet TAKE 1 TABLET BY MOUTH DAILY. 30 tablet 5   • mometasone-formoterol (DULERA 200) 200-5 MCG/ACT inhaler Inhale 2 puffs 2 (Two) Times a Day.     • montelukast (SINGULAIR) 10 MG tablet Take 10 mg by mouth Every Night.     • pravastatin (PRAVACHOL) 10 MG tablet Take 10 mg by mouth Daily.       No current facility-administered medications on file prior to visit.        ALLERGIES    Sulfa antibiotics    Past Medical History:   Diagnosis Date   • Asthma    • Hyperlipidemia    • Hypertension        Social History     Socioeconomic History   • Marital status:      Spouse name:  "Not on file   • Number of children: Not on file   • Years of education: Not on file   • Highest education level: Not on file   Tobacco Use   • Smoking status: Never Smoker   • Smokeless tobacco: Never Used   Substance and Sexual Activity   • Alcohol use: No   • Drug use: No   • Sexual activity: Never       Family History   Problem Relation Age of Onset   • Heart disease Mother    • Hypertension Mother    • Hyperlipidemia Mother    • Heart disease Father    • Hyperlipidemia Father    • Hypertension Father    • Hypertension Brother    • Hyperlipidemia Brother        Review of Systems   Constitutional: Negative for chills, fatigue and fever.   HENT: Negative for congestion, rhinorrhea and sore throat.    Eyes: Positive for visual disturbance (reading glasses).   Respiratory: Negative for cough, chest tightness, shortness of breath and wheezing.    Cardiovascular: Negative for chest pain, palpitations and leg swelling.   Gastrointestinal: Negative for abdominal pain, blood in stool, nausea and vomiting.   Endocrine: Negative for cold intolerance and heat intolerance.   Genitourinary: Negative for dysuria, frequency, hematuria and urgency.   Musculoskeletal: Negative for arthralgias, back pain and neck pain.   Skin: Negative for rash and wound.   Allergic/Immunologic: Positive for environmental allergies (seasonal). Negative for food allergies.   Neurological: Negative for dizziness, weakness and light-headedness.   Hematological: Does not bruise/bleed easily.   Psychiatric/Behavioral: Negative for sleep disturbance (denies waking with smothering).       Objective   /90 (BP Location: Left arm, Patient Position: Sitting)   Pulse 79   Ht 149.9 cm (59\")   Wt 52.2 kg (115 lb)   SpO2 95%   BMI 23.23 kg/m²   Vitals:    09/03/20 0849   BP: 139/90   BP Location: Left arm   Patient Position: Sitting   Pulse: 79   SpO2: 95%   Weight: 52.2 kg (115 lb)   Height: 149.9 cm (59\")      Lab Results (most recent)     None    "     Physical Exam   Constitutional: She is oriented to person, place, and time. Vital signs are normal. She appears well-developed and well-nourished. She is active and cooperative.   HENT:   Head: Normocephalic.   Eyes: Lids are normal.   Neck: Normal carotid pulses, no hepatojugular reflux and no JVD present. Carotid bruit is not present.   Cardiovascular: Normal rate, regular rhythm and normal heart sounds.   Pulses:       Radial pulses are 2+ on the right side, and 2+ on the left side.        Dorsalis pedis pulses are 2+ on the right side, and 2+ on the left side.        Posterior tibial pulses are 2+ on the right side, and 2+ on the left side.   No edema BLE.   Pulmonary/Chest: Effort normal and breath sounds normal.   Abdominal: Normal appearance and bowel sounds are normal.   Neurological: She is alert and oriented to person, place, and time.   Skin: Skin is warm, dry and intact.   Psychiatric: She has a normal mood and affect. Her speech is normal and behavior is normal. Judgment and thought content normal. Cognition and memory are normal.       Procedure     ECG 12 Lead  Date/Time: 9/3/2020 8:54 AM  Performed by: Dianelys Cagle APRN  Authorized by: Dianelys Cagle APRN   Comparison: compared with previous ECG from 5/21/2019  Comparison to previous ECG: IRBBB on todays EKG  Rhythm: sinus rhythm  Rate: normal  BPM: 71  Conduction: incomplete right bundle branch block  QRS axis: normal    Clinical impression: non-specific ECG                 Assessment/Plan      Diagnosis Plan   1. Essential hypertension  ECG 12 Lead   2. Hyperlipidemia, unspecified hyperlipidemia type     3. Shortness of breath     4. Grade II diastolic dysfunction         Return in about 1 year (around 9/3/2021).    Hypertension-patient's doing well on losartan per her report.  Hyperlipidemia-patient's on pravastatin.  Shortness of breath-resolved.  Diastolic dysfunction 2-patient denies any signs of failure.  She will continue her  medication regimen.  She will follow-up in a year or sooner if any changes.       Idania RENO Gadiel  reports that she has never smoked. She has never used smokeless tobacco.         Patient's Body mass index is 23.23 kg/m². BMI is within normal parameters. No follow-up required..      Electronically signed by:

## 2021-01-13 RX ORDER — LOSARTAN POTASSIUM 50 MG/1
TABLET ORAL
Qty: 30 TABLET | Refills: 11 | Status: SHIPPED | OUTPATIENT
Start: 2021-01-13 | End: 2022-01-04

## 2021-03-09 ENCOUNTER — APPOINTMENT (OUTPATIENT)
Dept: WOMENS IMAGING | Facility: HOSPITAL | Age: 56
End: 2021-03-09

## 2021-03-09 PROCEDURE — 77067 SCR MAMMO BI INCL CAD: CPT | Performed by: RADIOLOGY

## 2021-03-09 PROCEDURE — 77063 BREAST TOMOSYNTHESIS BI: CPT | Performed by: RADIOLOGY

## 2021-09-01 ENCOUNTER — OFFICE VISIT (OUTPATIENT)
Dept: CARDIOLOGY | Facility: CLINIC | Age: 56
End: 2021-09-01

## 2021-09-01 VITALS
HEIGHT: 59 IN | SYSTOLIC BLOOD PRESSURE: 128 MMHG | WEIGHT: 114 LBS | BODY MASS INDEX: 22.98 KG/M2 | TEMPERATURE: 97.6 F | DIASTOLIC BLOOD PRESSURE: 78 MMHG | HEART RATE: 89 BPM | OXYGEN SATURATION: 99 %

## 2021-09-01 DIAGNOSIS — E78.5 HYPERLIPIDEMIA, UNSPECIFIED HYPERLIPIDEMIA TYPE: ICD-10-CM

## 2021-09-01 DIAGNOSIS — I10 ESSENTIAL HYPERTENSION: Primary | ICD-10-CM

## 2021-09-01 DIAGNOSIS — R06.02 SHORTNESS OF BREATH: ICD-10-CM

## 2021-09-01 PROCEDURE — 99214 OFFICE O/P EST MOD 30 MIN: CPT | Performed by: NURSE PRACTITIONER

## 2021-09-01 PROCEDURE — 93000 ELECTROCARDIOGRAM COMPLETE: CPT | Performed by: NURSE PRACTITIONER

## 2021-09-01 NOTE — PROGRESS NOTES
Subjective   Idania Ramesh is a 56 y.o. female     Chief Complaint   Patient presents with   • Follow-up   • Hypertension       [facility]    Problem List:    1. Hypertension  1.1 Echo 6/4/18 - mild LVH; EF > 65%; DD II; mild AR; mild MR: mild TR; mild ME  2. Hyperlipidemia   3. Asthma     Patient is a 56-year-old female who presents today for follow-up.  She denies any chest pain, pressure, palpitations, fluttering, dizziness, presyncope, syncope, orthopnea, PND or edema.  She denies any shortness of breath with activity.  She is still enjoying her job.  She did have lab work which she forgot in her car but she is going to bring it inside to me after she checks out.      Current Outpatient Medications on File Prior to Visit   Medication Sig Dispense Refill   • albuterol sulfate HFA (VENTOLIN HFA) 108 (90 Base) MCG/ACT inhaler Inhale 1 puff.     • alendronate (FOSAMAX) 70 MG tablet Take 70 mg by mouth Every 7 (Seven) Days.     • Beclomethasone Diprop HFA (QVAR REDIHALER) 80 MCG/ACT inhaler      • EPINEPHrine (EPIPEN 2-SIMON IJ) Inject  as directed As Needed.     • levothyroxine (SYNTHROID, LEVOTHROID) 25 MCG tablet Take 25 mcg by mouth Daily.     • Loratadine (CLARITIN) 10 MG capsule Take 10 mg by mouth Daily.     • losartan (COZAAR) 50 MG tablet TAKE 1 TABLET BY MOUTH DAILY. 30 tablet 11   • mometasone-formoterol (DULERA 200) 200-5 MCG/ACT inhaler Inhale 2 puffs 2 (Two) Times a Day.     • montelukast (SINGULAIR) 10 MG tablet Take 10 mg by mouth Every Night.     • pravastatin (PRAVACHOL) 10 MG tablet Take 10 mg by mouth Daily.       No current facility-administered medications on file prior to visit.       ALLERGIES    Sulfa antibiotics    Past Medical History:   Diagnosis Date   • Asthma    • COVID-19 1st- 03/19/2021 2nd -04/16/2021 - [facility]    • Hyperlipidemia    • Hypertension        Social History     Socioeconomic History   • Marital status:      Spouse name: Not on file   • Number of children: Not on file  "  • Years of education: Not on file   • Highest education level: Not on file   Tobacco Use   • Smoking status: Never Smoker   • Smokeless tobacco: Never Used   Substance and Sexual Activity   • Alcohol use: No   • Drug use: No   • Sexual activity: Never       Family History   Problem Relation Age of Onset   • Heart disease Mother    • Hypertension Mother    • Hyperlipidemia Mother    • Heart disease Father    • Hyperlipidemia Father    • Hypertension Father    • Hypertension Brother    • Hyperlipidemia Brother        Review of Systems   Constitutional: Negative for appetite change, chills, fatigue and fever.   HENT: Negative for congestion, rhinorrhea and sore throat.    Eyes: Positive for visual disturbance (reading glasses).   Respiratory: Negative for cough, chest tightness, shortness of breath and wheezing.    Cardiovascular: Negative for chest pain, palpitations and leg swelling.   Gastrointestinal: Negative for abdominal pain, blood in stool, constipation, diarrhea, nausea and vomiting.   Endocrine: Negative for cold intolerance and heat intolerance.   Genitourinary: Negative for difficulty urinating, dysuria, frequency, hematuria and urgency.   Musculoskeletal: Positive for arthralgias (Through out the body ). Negative for back pain, joint swelling, neck pain and neck stiffness.   Skin: Negative for color change, pallor, rash and wound.   Allergic/Immunologic: Positive for environmental allergies (seasonal ). Negative for food allergies.   Neurological: Negative for dizziness, weakness, light-headedness, numbness and headaches.   Hematological: Does not bruise/bleed easily.   Psychiatric/Behavioral: Negative for sleep disturbance.       Objective   /78 (BP Location: Left arm, Patient Position: Sitting)   Pulse 89   Temp 97.6 °F (36.4 °C)   Ht 149.9 cm (59\")   Wt 51.7 kg (114 lb)   SpO2 99%   BMI 23.03 kg/m²   Vitals:    09/01/21 0858 09/01/21 0918   BP: 156/94 128/78   BP Location: Left arm Left " "arm   Patient Position:  Sitting   Pulse: 89    Temp: 97.6 °F (36.4 °C)    SpO2: 99%    Weight: 51.7 kg (114 lb)    Height: 149.9 cm (59\")       Lab Results (most recent)     None        Physical Exam  Vitals reviewed.   Constitutional:       General: She is awake.      Appearance: Normal appearance. She is well-developed, well-groomed and normal weight.   HENT:      Head: Normocephalic.   Eyes:      General: Lids are normal.   Neck:      Vascular: No carotid bruit, hepatojugular reflux or JVD.   Cardiovascular:      Rate and Rhythm: Normal rate and regular rhythm.      Pulses:           Radial pulses are 2+ on the right side and 2+ on the left side.        Dorsalis pedis pulses are 2+ on the right side and 2+ on the left side.        Posterior tibial pulses are 2+ on the right side and 2+ on the left side.      Heart sounds: Normal heart sounds.   Pulmonary:      Effort: Pulmonary effort is normal.      Breath sounds: Normal breath sounds and air entry.   Abdominal:      General: Bowel sounds are normal.      Palpations: Abdomen is soft.   Musculoskeletal:      Right lower leg: No edema.      Left lower leg: No edema.   Skin:     General: Skin is warm and dry.   Neurological:      Mental Status: She is alert and oriented to person, place, and time.   Psychiatric:         Attention and Perception: Attention and perception normal.         Mood and Affect: Mood and affect normal.         Speech: Speech normal.         Behavior: Behavior normal. Behavior is cooperative.         Thought Content: Thought content normal.         Cognition and Memory: Cognition and memory normal.         Judgment: Judgment normal.         Procedure     ECG 12 Lead    Date/Time: 9/1/2021 9:19 AM  Performed by: Dianelys Cagle APRN  Authorized by: Dianelys Cagle APRN   Comparison: compared with previous ECG from 9/3/2020  Comparison to previous ECG: Previously IR[facility]  Rhythm: sinus rhythm  Rate: normal  BPM: 86  QRS axis: " normal    Clinical impression: non-specific ECG                 Assessment/Plan      Diagnosis Plan   1. Essential hypertension     2. Hyperlipidemia, unspecified hyperlipidemia type     3. Shortness of breath         Return in about 1 year (around 9/1/2022).    Hypertension-patient's on losartan and doing well.  Hyperlipidemia-patient is on pravastatin and her last LDL was still elevated.  Unsure if her PCP is going to recheck or make any adjustments.  Her LDL was actually 112.  Shortness of breath-resolved.  Patient will continue her medication regimen for now.  She will follow-up in 1 year sooner if any changes.  If lipids are still elevated next year and her PCP has not made any changes we will adjust her statin at that time.       Idania SHADIA Ramesh  reports that she has never smoked. She has never used smokeless tobacco.. Patient brought in medication list to appointment, it's been reviewed with patient and med list was updated in the chart.     Electronically signed by:

## 2022-01-04 DIAGNOSIS — I10 ESSENTIAL HYPERTENSION: Primary | ICD-10-CM

## 2022-01-04 RX ORDER — LOSARTAN POTASSIUM 50 MG/1
TABLET ORAL
Qty: 30 TABLET | Refills: 11 | Status: SHIPPED | OUTPATIENT
Start: 2022-01-04 | End: 2022-09-01 | Stop reason: SDUPTHER

## 2022-03-30 ENCOUNTER — APPOINTMENT (OUTPATIENT)
Dept: WOMENS IMAGING | Facility: HOSPITAL | Age: 57
End: 2022-03-30

## 2022-03-30 PROCEDURE — 77063 BREAST TOMOSYNTHESIS BI: CPT | Performed by: RADIOLOGY

## 2022-03-30 PROCEDURE — 77067 SCR MAMMO BI INCL CAD: CPT | Performed by: RADIOLOGY

## 2022-04-15 ENCOUNTER — APPOINTMENT (OUTPATIENT)
Dept: WOMENS IMAGING | Facility: HOSPITAL | Age: 57
End: 2022-04-15

## 2022-04-15 PROCEDURE — 76641 ULTRASOUND BREAST COMPLETE: CPT | Performed by: RADIOLOGY

## 2022-04-15 PROCEDURE — 77065 DX MAMMO INCL CAD UNI: CPT | Performed by: RADIOLOGY

## 2022-04-15 PROCEDURE — 77061 BREAST TOMOSYNTHESIS UNI: CPT | Performed by: RADIOLOGY

## 2022-09-01 ENCOUNTER — OFFICE VISIT (OUTPATIENT)
Dept: CARDIOLOGY | Facility: CLINIC | Age: 57
End: 2022-09-01

## 2022-09-01 VITALS
SYSTOLIC BLOOD PRESSURE: 156 MMHG | WEIGHT: 117 LBS | BODY MASS INDEX: 23.59 KG/M2 | HEIGHT: 59 IN | OXYGEN SATURATION: 97 % | DIASTOLIC BLOOD PRESSURE: 97 MMHG | HEART RATE: 79 BPM | TEMPERATURE: 97.2 F

## 2022-09-01 DIAGNOSIS — R06.02 SHORTNESS OF BREATH: ICD-10-CM

## 2022-09-01 DIAGNOSIS — I10 ESSENTIAL HYPERTENSION: Primary | ICD-10-CM

## 2022-09-01 DIAGNOSIS — I51.89 GRADE II DIASTOLIC DYSFUNCTION: ICD-10-CM

## 2022-09-01 DIAGNOSIS — E78.5 HYPERLIPIDEMIA, UNSPECIFIED HYPERLIPIDEMIA TYPE: ICD-10-CM

## 2022-09-01 PROCEDURE — 99214 OFFICE O/P EST MOD 30 MIN: CPT | Performed by: NURSE PRACTITIONER

## 2022-09-01 PROCEDURE — 93000 ELECTROCARDIOGRAM COMPLETE: CPT | Performed by: NURSE PRACTITIONER

## 2022-09-01 RX ORDER — LOSARTAN POTASSIUM 50 MG/1
50 TABLET ORAL DAILY
Qty: 30 TABLET | Refills: 11 | Status: SHIPPED | OUTPATIENT
Start: 2022-09-01

## 2022-09-01 NOTE — PROGRESS NOTES
Subjective   Idania Ramesh is a 57 y.o. female     Chief Complaint   Patient presents with   • Follow-up       HPI    Problem List:    1. Hypertension  1.1 Echo 6/4/18 - mild LVH; EF > 65%; DD II; mild AR; mild MR: mild TR; mild [place]  2. Hyperlipidemia   3. Asthma     Patient is a 57-year-old female who presents today for follow-up.  She denies any chest pain, pressure, palpitations, fluttering, dizziness, presyncope, syncope, orthopnea, PND or edema.  Patient denies any shortness of breath with activity.  She says she had pneumonia earlier this year but did not have COVID.  She says she is doing much better than when she is working 5 days a week and loves her job.  This month is 4 years since her  passed away.    We went over her labs.  LDL 88.6 and triglycerides were 247.  Was encouraged to decrease sweets and carbs.  Potassium, creatinine and thyroid were within normal limits.    Current Outpatient Medications on File Prior to Visit   Medication Sig Dispense Refill   • alendronate (FOSAMAX) 70 MG tablet Take 70 mg by mouth Every 7 (Seven) Days.     • Beclomethasone Diprop HFA (QVAR Redihaler) 80 MCG/ACT inhaler      • EPINEPHrine (EPIPEN 2-SIMON IJ) Inject  as directed As Needed.     • levothyroxine (SYNTHROID, LEVOTHROID) 25 MCG tablet Take 25 mcg by mouth Daily.     • Loratadine 10 MG capsule Take 10 mg by mouth Daily.     • montelukast (SINGULAIR) 10 MG tablet Take 10 mg by mouth Every Night.     • pravastatin (PRAVACHOL) 10 MG tablet Take 10 mg by mouth Daily.     • [DISCONTINUED] losartan (COZAAR) 50 MG tablet TAKE 1 TABLET BY MOUTH DAILY. 30 tablet 11   • [DISCONTINUED] albuterol sulfate  (90 Base) MCG/ACT inhaler Inhale 1 puff.     • [DISCONTINUED] mometasone-formoterol (DULERA 200) 200-5 MCG/ACT inhaler Inhale 2 puffs 2 (Two) Times a Day.       No current facility-administered medications on file prior to visit.       ALLERGIES    Sulfa antibiotics    Past Medical History:   Diagnosis Date    • Asthma    • COVID-19 VACCINE 1st- 03/19/2021    2nd -04/16/2021 - [facility] , 11/30/2021   • Hyperlipidemia    • Hypertension        Social History     Socioeconomic History   • Marital status:    Tobacco Use   • Smoking status: Never Smoker   • Smokeless tobacco: Never Used   Vaping Use   • Vaping Use: Never used   Substance and Sexual Activity   • Alcohol use: No   • Drug use: No   • Sexual activity: Never       Family History   Problem Relation Age of Onset   • Heart disease Mother    • Hypertension Mother    • Hyperlipidemia Mother    • Heart disease Father    • Hyperlipidemia Father    • Hypertension Father    • Hypertension Brother    • Hyperlipidemia Brother        Review of Systems   Constitutional: Negative for appetite change, chills, diaphoresis, fatigue and fever.   HENT: Negative for congestion, rhinorrhea and sore throat.    Eyes: Negative for visual disturbance.   Respiratory: Negative for cough, chest tightness, shortness of breath and wheezing.    Cardiovascular: Negative for chest pain, palpitations and leg swelling.   Gastrointestinal: Negative for abdominal pain, blood in stool, constipation, diarrhea, nausea and vomiting.   Endocrine: Negative for cold intolerance and heat intolerance.   Genitourinary: Negative for difficulty urinating, dysuria, frequency, hematuria and urgency.   Musculoskeletal: Positive for arthralgias (THROUGHOUT THE BODY ). Negative for back pain, joint swelling, neck pain and neck stiffness.   Skin: Negative for color change, pallor, rash and wound.   Allergic/Immunologic: Negative for environmental allergies and food allergies.   Neurological: Negative for dizziness, syncope, weakness, light-headedness, numbness and headaches.   Hematological: Does not bruise/bleed easily.   Psychiatric/Behavioral: Negative for sleep disturbance.       Objective   /97 (BP Location: Left arm, Patient Position: Sitting)   Pulse 79   Temp 97.2 °F (36.2 °C)   Ht 149.9 cm  "(59\")   Wt 53.1 kg (117 lb)   SpO2 97%   BMI 23.63 kg/m²   Vitals:    09/01/22 0851   BP: 156/97   BP Location: Left arm   Patient Position: Sitting   Pulse: 79   Temp: 97.2 °F (36.2 °C)   SpO2: 97%   Weight: 53.1 kg (117 lb)   Height: 149.9 cm (59\")      Lab Results (most recent)     None        Physical Exam  Vitals reviewed.   Constitutional:       General: She is awake.      Appearance: Normal appearance. She is well-developed, well-groomed and normal weight.   HENT:      Head: Normocephalic.   Eyes:      General: Lids are normal.   Neck:      Vascular: No carotid bruit, hepatojugular reflux or JVD.   Cardiovascular:      Rate and Rhythm: Normal rate and regular rhythm.      Pulses:           Radial pulses are 2+ on the right side and 2+ on the left side.        Dorsalis pedis pulses are 2+ on the right side and 2+ on the left side.        Posterior tibial pulses are 2+ on the right side and 2+ on the left side.      Heart sounds: Normal heart sounds.   Pulmonary:      Effort: Pulmonary effort is normal.      Breath sounds: Normal breath sounds and air entry.   Abdominal:      General: Bowel sounds are normal.      Palpations: Abdomen is soft.   Musculoskeletal:      Right lower leg: No edema.      Left lower leg: No edema.   Skin:     General: Skin is warm and dry.   Neurological:      Mental Status: She is alert and oriented to person, place, and time.   Psychiatric:         Attention and Perception: Attention and perception normal.         Mood and Affect: Mood and affect normal.         Speech: Speech normal.         Behavior: Behavior normal. Behavior is cooperative.         Thought Content: Thought content normal.         Cognition and Memory: Cognition and memory normal.         Judgment: Judgment normal.         Procedure     ECG 12 Lead    Date/Time: 9/1/2022 9:17 AM  Performed by: Dianelys Cagle APRN  Authorized by: Dianelys Cagle APRN   Comparison: compared with previous ECG from " 9/1/2021  Similar to previous ECG  Rhythm: sinus rhythm  Rate: normal  BPM: 79  QRS axis: normal    Clinical impression: normal ECG                 Assessment & Plan      Diagnosis Plan   1. Essential hypertension  losartan (COZAAR) 50 MG tablet   2. Hyperlipidemia, unspecified hyperlipidemia type     3. Shortness of breath     4. Grade II diastolic dysfunction         Return in about 1 year (around 9/1/2023).    Hypertension-patient is on losartan.  Per her report her blood pressure is excellent.  She said it was very foggy driving here and stressed her out.  She says it was 121/74 the last time it was checked.  Hyperlipidemia-patient's on pravastatin and doing well last LDL was 88.6.  Shortness of breath-resolved.  Diastolic dysfunction-no signs of failure.  Patient will continue her medication regimen.  She will follow-up in 1 year sooner if any changes.       Idania Ramesh  reports that she has never smoked. She has never used smokeless tobacco.. Patient brought in medicine list to appointment, it's been reviewed with patient and med list was updated in the chart.     Electronically signed by:

## 2023-09-06 DIAGNOSIS — I10 ESSENTIAL HYPERTENSION: ICD-10-CM

## 2023-09-06 RX ORDER — LOSARTAN POTASSIUM 50 MG/1
TABLET ORAL
Qty: 30 TABLET | Refills: 11 | Status: SHIPPED | OUTPATIENT
Start: 2023-09-06

## 2023-09-18 ENCOUNTER — TELEPHONE (OUTPATIENT)
Dept: CARDIOLOGY | Facility: CLINIC | Age: 58
End: 2023-09-18
Payer: COMMERCIAL

## 2023-09-18 NOTE — TELEPHONE ENCOUNTER
For the HUB to read to pt:       Patient scheduled in an overbook spot, needing to move her appointment to the following day. If patient calls back please let me know. Thank you

## 2023-09-26 ENCOUNTER — OFFICE VISIT (OUTPATIENT)
Dept: CARDIOLOGY | Facility: CLINIC | Age: 58
End: 2023-09-26
Payer: COMMERCIAL

## 2023-09-26 VITALS
OXYGEN SATURATION: 95 % | HEIGHT: 59 IN | BODY MASS INDEX: 24.56 KG/M2 | HEART RATE: 83 BPM | DIASTOLIC BLOOD PRESSURE: 101 MMHG | SYSTOLIC BLOOD PRESSURE: 161 MMHG | WEIGHT: 121.8 LBS

## 2023-09-26 DIAGNOSIS — I10 ESSENTIAL HYPERTENSION: Primary | ICD-10-CM

## 2023-09-26 DIAGNOSIS — E78.5 HYPERLIPIDEMIA, UNSPECIFIED HYPERLIPIDEMIA TYPE: ICD-10-CM

## 2023-09-26 DIAGNOSIS — I51.89 GRADE II DIASTOLIC DYSFUNCTION: ICD-10-CM

## 2023-09-26 RX ORDER — ALBUTEROL SULFATE 90 UG/1
2 AEROSOL, METERED RESPIRATORY (INHALATION) EVERY 4 HOURS PRN
COMMUNITY

## 2023-09-26 RX ORDER — BUDESONIDE AND FORMOTEROL FUMARATE DIHYDRATE 160; 4.5 UG/1; UG/1
AEROSOL RESPIRATORY (INHALATION)
COMMUNITY
Start: 2023-08-08

## 2023-09-26 NOTE — PROGRESS NOTES
Subjective     Idania Ramesh is a 58 y.o. female who presents today for Follow-up and Hypertension.    CHIEF COMPLIANT  Chief Complaint   Patient presents with    Follow-up    Hypertension       Active Problems:     1. Hypertension  1.1 Echo 6/4/18 - mild LVH; EF > 65%; DD II; mild AR; mild MR: mild TR; mild [place]  2. Hyperlipidemia   3. Asthma     HPI  The patient is a 58-year-old female who returns for routine follow-up.  Blood pressure is elevated today at 161/101.  She is upset today because she states it is the 5-year anniversary since she lost her .  Says she has been monitoring her blood pressure at home and it usually runs within the 120-130 systolic range.  He states that this time a year is always difficult for her and when she is upset her blood pressure will always be elevated.  She denies chest pain, worsening shortness of air, syncope, near syncope, lower extremity edema, orthopnea or PND.  He does have asthma and will have some shortness of air at baseline which does improve when she uses her inhaler.  Her EKG shows normal sinus rhythm with no acute ST or T wave abnormalities.    PRIOR MEDS  Current Outpatient Medications on File Prior to Visit   Medication Sig Dispense Refill    albuterol sulfate  (90 Base) MCG/ACT inhaler Inhale 2 puffs Every 4 (Four) Hours As Needed for Wheezing.      alendronate (FOSAMAX) 70 MG tablet Take 1 tablet by mouth Every 7 (Seven) Days.      levothyroxine (SYNTHROID, LEVOTHROID) 25 MCG tablet Take 1 tablet by mouth Daily.      Loratadine 10 MG capsule Take 1 capsule by mouth Daily.      losartan (COZAAR) 50 MG tablet TAKE 1 TABLET EVERY DAY 30 tablet 11    montelukast (SINGULAIR) 10 MG tablet Take 1 tablet by mouth Every Night.      pravastatin (PRAVACHOL) 10 MG tablet Take 1 tablet by mouth Daily.      Symbicort 160-4.5 MCG/ACT inhaler       [DISCONTINUED] Beclomethasone Diprop HFA (QVAR Redihaler) 80 MCG/ACT inhaler       EPINEPHrine (EPIPEN 2-SIMON IJ)  Inject  as directed As Needed.       No current facility-administered medications on file prior to visit.       ALLERGIES  Sulfa antibiotics    HISTORY  Past Medical History:   Diagnosis Date    Asthma     COVID-19 VACCINE 1st- 03/19/2021    2nd -04/16/2021 - [facility] , 11/30/2021    Hyperlipidemia     Hypertension        Social History     Socioeconomic History    Marital status:    Tobacco Use    Smoking status: Never    Smokeless tobacco: Never   Vaping Use    Vaping Use: Never used   Substance and Sexual Activity    Alcohol use: No    Drug use: No    Sexual activity: Never       Family History   Problem Relation Age of Onset    Heart disease Mother     Hypertension Mother     Hyperlipidemia Mother     Heart disease Father     Hyperlipidemia Father     Hypertension Father     Hypertension Brother     Hyperlipidemia Brother        Review of Systems   Constitutional: Negative.  Negative for chills, diaphoresis, fatigue and fever.   HENT: Negative.     Eyes: Negative.  Negative for visual disturbance.   Respiratory: Negative.  Negative for apnea, cough, chest tightness, shortness of breath and wheezing.    Cardiovascular: Negative.  Negative for chest pain, palpitations and leg swelling.   Gastrointestinal: Negative.  Negative for blood in stool.   Endocrine: Negative.  Negative for cold intolerance and heat intolerance.   Genitourinary: Negative.  Negative for hematuria.   Musculoskeletal:  Positive for arthralgias (osteoporosis). Negative for back pain, myalgias, neck pain and neck stiffness.   Skin: Negative.  Negative for color change, rash and wound.   Allergic/Immunologic: Negative.  Negative for environmental allergies and food allergies.   Neurological: Negative.  Negative for dizziness, syncope, weakness, light-headedness, numbness and headaches.   Hematological: Negative.  Does not bruise/bleed easily.   Psychiatric/Behavioral: Negative.  Negative for sleep disturbance.      Objective     VITALS: BP  "(!) 161/101 (BP Location: Left arm, Patient Position: Sitting)   Pulse 83   Ht 149.9 cm (59\")   Wt 55.2 kg (121 lb 12.8 oz)   SpO2 95%   BMI 24.60 kg/m²     LABS:   Lab Results (most recent)       None            IMAGING:   No Images in the past 120 days found..    EXAM:  Physical Exam  Constitutional:       Appearance: Normal appearance.   Eyes:      Pupils: Pupils are equal, round, and reactive to light.   Cardiovascular:      Rate and Rhythm: Normal rate and regular rhythm.      Pulses:           Carotid pulses are 2+ on the right side and 2+ on the left side.       Radial pulses are 2+ on the right side and 2+ on the left side.        Dorsalis pedis pulses are 2+ on the right side and 2+ on the left side.        Posterior tibial pulses are 2+ on the right side and 2+ on the left side.      Heart sounds: Normal heart sounds.   Pulmonary:      Effort: Pulmonary effort is normal.      Breath sounds: Normal breath sounds.   Abdominal:      General: Bowel sounds are normal.      Palpations: Abdomen is soft.   Musculoskeletal:      Right lower leg: No edema.      Left lower leg: No edema.   Skin:     General: Skin is warm and dry.      Capillary Refill: Capillary refill takes less than 2 seconds.   Neurological:      General: No focal deficit present.      Mental Status: She is alert and oriented to person, place, and time.   Psychiatric:         Mood and Affect: Mood normal.         Thought Content: Thought content normal.       Procedure     ECG 12 Lead    Date/Time: 9/26/2023 9:30 AM  Performed by: Latasha Hill APRN  Authorized by: Latasha Hill APRN   Comparison: compared with previous ECG from 9/1/2022  Rhythm: sinus rhythm  Rate: normal  BPM: 77  Conduction: conduction normal  ST Segments: ST segments normal  T Waves: T waves normal  QRS axis: normal  Comments: No acute changes  Qtc 428ms           Assessment & Plan    Diagnosis Plan   1. Essential hypertension  ECG 12 Lead      2. " Hyperlipidemia, unspecified hyperlipidemia type        3. Grade II diastolic dysfunction          Plan:  1.  Hypertension: Patient's blood pressure is elevated in the office today as it is the anniversary of her 's death.  The patient states she checks this at home and it maintains within the 120-130 systolic range.  We will continue her losartan at the current dose.  The patient was instructed to monitor BP at home and to notify our office if systolic BP is consistently greater than 130-135 systolic.  Goal BP is 130-135/70-80.  2.  Hyperlipidemia: Continue statin.  Patient is due for labs with her PCP soon and will have a copy sent to the office for review.  3.  Grade 2 diastolic dysfunction: No current signs of overload.  Patient was advised to notify our office of any symptoms of shortness of air or lower extremity edema develop.  Return in about 1 year (around 9/26/2024).    Patient brought in medicine list to appointment, it's been reviewed with patient and med list was updated in the chart.         MEDS ORDERED DURING VISIT:  No orders of the defined types were placed in this encounter.      DISCONTINUED MEDS DURING VISIT:   Medications Discontinued During This Encounter   Medication Reason    Beclomethasone Diprop HFA (QVAR Redihaler) 80 MCG/ACT inhaler Discontinued by another clinician          This document has been electronically signed by GARDENIA Nuñez  September 26, 2023 09:54 EDT    Dictated Utilizing Dragon Dictation: Part of this note may be an electronic transcription/translation of spoken language to printed text using the Dragon Dictation System

## 2024-08-30 DIAGNOSIS — I10 ESSENTIAL HYPERTENSION: ICD-10-CM

## 2024-08-30 RX ORDER — LOSARTAN POTASSIUM 50 MG/1
50 TABLET ORAL DAILY
Qty: 30 TABLET | Refills: 1 | Status: SHIPPED | OUTPATIENT
Start: 2024-08-30

## 2024-10-30 DIAGNOSIS — I10 ESSENTIAL HYPERTENSION: ICD-10-CM

## 2024-10-31 RX ORDER — LOSARTAN POTASSIUM 50 MG/1
50 TABLET ORAL DAILY
Qty: 90 TABLET | Refills: 0 | Status: SHIPPED | OUTPATIENT
Start: 2024-10-31

## 2025-01-03 ENCOUNTER — TELEPHONE (OUTPATIENT)
Dept: CARDIOLOGY | Facility: CLINIC | Age: 60
End: 2025-01-03
Payer: COMMERCIAL

## 2025-01-28 DIAGNOSIS — I10 ESSENTIAL HYPERTENSION: ICD-10-CM

## 2025-01-29 RX ORDER — LOSARTAN POTASSIUM 50 MG/1
50 TABLET ORAL DAILY
Qty: 90 TABLET | Refills: 1 | Status: SHIPPED | OUTPATIENT
Start: 2025-01-29

## 2025-07-25 ENCOUNTER — TELEPHONE (OUTPATIENT)
Dept: CARDIOLOGY | Facility: CLINIC | Age: 60
End: 2025-07-25

## 2025-07-25 DIAGNOSIS — I10 ESSENTIAL HYPERTENSION: ICD-10-CM

## 2025-07-25 RX ORDER — LOSARTAN POTASSIUM 50 MG/1
50 TABLET ORAL DAILY
Qty: 90 TABLET | Refills: 1 | OUTPATIENT
Start: 2025-07-25